# Patient Record
(demographics unavailable — no encounter records)

---

## 2017-11-02 NOTE — CPEKG
Heart Rate: 66

RR Interval: 909

P-R Interval: 152

QRSD Interval: 86

QT Interval: 440

QTC Interval: 461

P Axis: 68

QRS Axis: 69

EKG Severity - NORMAL ECG -

EKG Impression: SINUS RHYTHM

Electronically Signed By: Saeed Luciano 02-Nov-2017 11:00:43

## 2017-11-03 NOTE — POSTANESTH
Post Anesthetic Evaluation


Respiratory Status: Normal, Stable


Level of Consciousness/Mental Status: Can Participate in Eval


Pain Control: Adequate, Prn Tx Ordered


Nausea/Vomiting Control: Adequate, Prn Tx Ordered


Complications Possibly Related to Anesthesia: None Noted

## 2017-11-03 NOTE — POSTOPPROG
Post Op Note


Date of Operation: 11/03/17


Surgeon: Adore Arevalo


Assistant: N/A


Anesthesia: LMA


Pre-op Diagnosis: Bladder tumor


Post-op Diagnosis: same


Indication: Recurrent bladder tumor, hx Ta medium size tumor


Procedure: cystoscopy, bilateral retrograde pyelograms, TURBT tumor small <5mm


Findings: small papillary tumor lateral to left UO, not involving UO.


Inf/Abcess present in the surg proc area at time of surgery?: No


Depth: Organ Space (bladder)


Complications: 





None, patient tolerated procedure well.


Drains: Other (Francis)


Specimen(s): 





Re-TUR near prior resection at anterior left bladder.  Separate bladder 

resection sent for papillary tumor lateral to left UO

## 2017-11-03 NOTE — GOP
[f 
rep st]



                                                                OPERATIVE REPORT





DATE OF OPERATION:  11/03/2017



SURGEON:  Adore Arevalo MD



ANESTHESIA:  LMA



PREOPERATIVE DIAGNOSIS:  Bladder tumor.



POSTOPERATIVE DIAGNOSIS:  Bladder tumor.



PROCEDURE PERFORMED:  cystoscopy, bilateral retrograde pyelograms, TURBT small <
0.5cm, intraoperative fluoroscopy. 



FINDINGS: Upper tracts appeared normal, delicate without hydronephrosis or 
filling defects bilaterally with retrograde pyelograms.  There was small <0.5mm 
papillary tumor, low lying lateral to left ureteral orifice, not involving the 
orifice.  NO tumor seen around prior left anterior resections site.  Bladder 
abnormally shaped due to prior urogyn surgeries, pulled off the to the right 
and high dome.   





INDICATIONS:  The patient underwent a urogynecologic procedure back in November
, 2016 with Dr. Saeed Gaitan at [facility] and, at the time of the cysto for the 
stress urinary incontinence sling, he noted a medium size bladder tumor.  Dr[facility] Vuong at [facility] then did a resection of this tumor in January, 2017 
and pathology was a TA but there was no muscle in the specimen and it was low 
grade. She had not had a surveillance cysto and came to me for that 
surveillance cysto approximately 2 weeks ago.  I looked in her bladder in my 
office and I noted what looked like recurrent tumor around the prior resection 
site and also other abnormal areas. I advised her to go back for a TUR of these 
areas.  The rationale, risks and benefits were discussed in detail with the 
patient and she agree to proceed.  Risks included bleeding, infection, pain, 
perforation of the bladder, injury to the bladder, injury to the urethra.  She 
understood these risks do not outweigh the benefit of resecting her tumor and 
she agreed to proceed.



DESCRIPTION OF PROCEDURE:  She was taken back to the cystoscopy suite, placed 
on the cystoscopy table in a supine position.  General anesthesia with an LMA 
was induced without complication.  A time-out was performed and core measures 
were satisfied including placement of a Bear Hugger, SCDs and administration of 
2 g of Ancef antibiotic. She was brought to the end of the table, placed in a 
dorsal lithotomy position, all pressure points were padded.  Genitalia were 
prepped and draped in a standard surgical fashion with Betadine.  A rigid 
cystoscope easily cannulated her urethral meatus and was advanced 
atraumatically into the bladder.  I did pan cystoscopy with a 30 and a 70 
degree lens.  Her bladder shape was abnormal, with the bladder pulled to the 
right and the dome very high.  Her left and right ureteral orifices were to the 
right, but otherwise in the correct anatomical position, no tumor was noted 
around them but there were  two areas of small, low lying papillary tumors just 
lateral to the left ureteral orifice.  Also, I noted in my clinic, that she was 
abnormal around that prior resection site but it did not look abnormal on my 
cysto in the OR but, given that there was no muscle in the specimen, I elected 
to also want to resect around that prior tumor area as well just to get some 
deeper samples.  She has not had a CT urogram recently and so I elected to do 
bilateral retrograde pyelograms.  I did the left collecting system first.  I 
advanced a 5-Greek open-ended Pollack catheter through the cystoscope and just 
into the left ureteral orifice.  Retrograde was performed and the collecting 
system was smooth and delicate.  There were no filling defects.  The renal 
pelvis was without hydronephrosis and was normal appearing and all the calices 
were normal appearing.  I then did a retrograde on the right.  I ran my own 
fluoroscopy for all of this and did all the interpretation.  I did the 
retrograde on the right in the same fashion and again, the ureter as well as 
the renal pelvis and the right collecting system were normal with no 
abnormalities, no filling defects or concerns for abnormal pathology.  The 
contrast also drained appropriately in both the right and left collecting 
systems.  



Next, I removed the cystoscope and assembled the resectoscope.  I started 
resecting with the prior area of resection around the bladder neck anterior 
left and used electrocautery to gain hemostasis.  There was very little 
bleeding.  Again, I did not see anything grossly abnormal but I did feel I 
wanted to resect this a little deeper to make sure there was no abnormal 
pathology.  These samples were collected and sent separately.  Her bladder was 
very oddly shaped with her recent urogynecologic procedures and, when you go in 
with your scope, the bladder goes off to her right side and then has a dome 
that is more then to the left and very high.  So, it was very difficult getting 
these bladder tumor pieces out given the abnormal shape of the bladder but I 
used an [employer] evacuator and was able to remove the pieces this way.  



Next, I then resected the small papillary low-lying tumor just left of the 
lateral ureteral orifice.  I did send this separately as well.  I used 
electrocautery around the sites of resection to cauterize a nice margin.  The 
left ureteral orifice was not involved at all in the resection nor was the left 
ureter.  The resections were a little deeper and so, at this point, I elected 
not to do Mitomycin just given that they were a little deeper and could have a 
small microperforation although I did not see any gross perforation.



At this point, all the tumor pieces were gathered.  I did also use an [employer] 
evacuator to gather some of those tumor pieces just because the abnormal 
anatomy of the bladder made it difficult for grabbing the pieces with the 
resectoscope loop.  Nonetheless, all the bladder tumor was removed at the end 
of the case.  All the pieces were sent, as I had previously said, and 
hemostasis was excellent.  I removed the scope and advanced the 16-Greek Francis 
catheter to empty her bladder.  I sent her into the PACU with the catheter and 
PACU will remove the catheter depending on how the urine appears in PACU.  She 
did well during the procedure and awoke from the general anesthesia without 
complication.



COMPLICATIONS:  None, patient tolerated procedure well.. 





Job #:  [id number]

MTDD

## 2017-11-03 NOTE — GOP
[f 
rep st]



                                                                OPERATIVE REPORT





DATE OF OPERATION:  11/03/2017



SURGEON:  Adore Arevalo MD



ANESTHESIA:  LMA



PREOPERATIVE DIAGNOSIS:  Bladder tumor.



POSTOPERATIVE DIAGNOSIS:  Bladder tumor.



PROCEDURE PERFORMED:  cystoscopy, bilateral retrograde pyelograms, TURBT small <
0.5cm, intraoperative fluoroscopy. 



FINDINGS: Upper tracts appeared normal, delicate without hydronephrosis or 
filling defects bilaterally with retrograde pyelograms.  There was small <0.5mm 
papillary tumor, low lying lateral to left ureteral orifice, not involving the 
orifice.  NO tumor seen around prior left anterior resections site.  Bladder 
abnormally shaped due to prior urogyn surgeries, pulled off the to the right 
and high dome.   





INDICATIONS:  The patient underwent a urogynecologic procedure back in November
, 2016 with Dr. Saeed Gaitan at [facility] and, at the time of the cysto for the 
stress urinary incontinence sling, he noted a medium size bladder tumor.  Dr. Vuong at [facility] then did a resection of this tumor in January, 2017 
and pathology was a TA but there was no muscle in the specimen and it was low 
grade. She had not had a surveillance cysto and came to me for that 
surveillance cysto approximately 2 weeks ago.  I looked in her bladder in my 
office and I noted what looked like recurrent tumor around the prior resection 
site and also other abnormal areas. I advised her to go back for a TUR of these 
areas.  The rationale, risks and benefits were discussed in detail with the 
patient and she agree to proceed.  Risks included bleeding, infection, pain, 
perforation of the bladder, injury to the bladder, injury to the urethra.  She 
understood these risks do not outweigh the benefit of resecting her tumor and 
she agreed to proceed.



DESCRIPTION OF PROCEDURE:  She was taken back to the cystoscopy suite, placed 
on the cystoscopy table in a supine position.  General anesthesia with an LMA 
was induced without complication.  A time-out was performed and core measures 
were satisfied including placement of a Bear Hugger, SCDs and administration of 
2 g of Ancef antibiotic. She was brought to the end of the table, placed in a 
dorsal lithotomy position, all pressure points were padded.  Genitalia were 
prepped and draped in a standard surgical fashion with Betadine.  A rigid 
cystoscope easily cannulated her urethral meatus and was advanced 
atraumatically into the bladder.  I did pan cystoscopy with a 30 and a 70 
degree lens.  Her bladder shape was abnormal, with the bladder pulled to the 
right and the dome very high.  Her left and right ureteral orifices were to the 
right, but otherwise in the correct anatomical position, no tumor was noted 
around them but there were  two areas of small, low lying papillary tumors just 
lateral to the left ureteral orifice.  Also, I noted in my clinic, that she was 
abnormal around that prior resection site but it did not look abnormal on my 
cysto in the OR but, given that there was no muscle in the specimen, I elected 
to also want to resect around that prior tumor area as well just to get some 
deeper samples.  She has not had a CT urogram recently and so I elected to do 
bilateral retrograde pyelograms.  I did the left collecting system first.  I 
advanced a 5-Romansh open-ended Pollack catheter through the cystoscope and just 
into the left ureteral orifice.  Retrograde was performed and the collecting 
system was smooth and delicate.  There were no filling defects.  The renal 
pelvis was without hydronephrosis and was normal appearing and all the calices 
were normal appearing.  I then did a retrograde on the right.  I ran my own 
fluoroscopy for all of this and did all the interpretation.  I did the 
retrograde on the right in the same fashion and again, the ureter as well as 
the renal pelvis and the right collecting system were normal with no 
abnormalities, no filling defects or concerns for abnormal pathology.  The 
contrast also drained appropriately in both the right and left collecting 
systems.  



Next, I removed the cystoscope and assembled the resectoscope.  I started 
resecting with the prior area of resection around the bladder neck anterior 
left and used electrocautery to gain hemostasis.  There was very little 
bleeding.  Again, I did not see anything grossly abnormal but I did feel I 
wanted to resect this a little deeper to make sure there was no abnormal 
pathology.  These samples were collected and sent separately.  Her bladder was 
very oddly shaped with her recent urogynecologic procedures and, when you go in 
with your scope, the bladder goes off to her right side and then has a dome 
that is more then to the left and very high.  So, it was very difficult getting 
these bladder tumor pieces out given the abnormal shape of the bladder but I 
used an [employer] evacuator and was able to remove the pieces this way.  



Next, I then resected the small papillary low-lying tumor just left of the 
lateral ureteral orifice.  I did send this separately as well.  I used 
electrocautery around the sites of resection to cauterize a nice margin.  The 
left ureteral orifice was not involved at all in the resection nor was the left 
ureter.  The resections were a little deeper and so, at this point, I elected 
not to do Mitomycin just given that they were a little deeper and could have a 
small microperforation although I did not see any gross perforation.



At this point, all the tumor pieces were gathered.  I did also use an [employer] 
evacuator to gather some of those tumor pieces just because the abnormal 
anatomy of the bladder made it difficult for grabbing the pieces with the 
resectoscope loop.  Nonetheless, all the bladder tumor was removed at the end 
of the case.  All the pieces were sent, as I had previously said, and 
hemostasis was excellent.  I removed the scope and advanced the 16-Romansh Francis 
catheter to empty her bladder.  I sent her into the PACU with the catheter and 
PACU will remove the catheter depending on how the urine appears in PACU.  She 
did well during the procedure and awoke from the general anesthesia without 
complication.



COMPLICATIONS:  None, patient tolerated procedure well.. 





Job #:  [id number]

MTDD

## 2017-11-03 NOTE — PDANEPAE
ANE History of Present Illness





57 year old woman for cystoscopy for excision of recurrent bladder tumor.  

History of Asthma, Obesity, Hypertension, and ADHD.





ANE Past Medical History





- Cardiovascular History


Hx Hypertension: Yes


Hx Arrhythmias: No


Hx Chest Pain: No


Hx Coronary Artery / Peripheral Vascular Disease: No


Hx CHF / Valvular Disease: No


Hx Palpitations: No





- Pulmonary History


Hx COPD: Yes


Hx Recent Upper Respiratory Infection: Yes


Hx Oxygen in Use at Home: No


Hx Sleep Apnea: No


Sleep Apnea Screening Result - Last Documented: Positive


Pulmonary History Comment: TROUBLE BREATHING AFTER SURGERY 2/2017 USED OXYGEN 

AT HOME POST OP





- Neurologic History


Hx Cerebrovascular Accident: No


Hx Seizures: No


Hx Dementia: No





- Endocrine History


Hx Diabetes: No


Endocrine History Comment: HYPOTHYROID





- Renal History


Hx Renal Disorders: No


Renal History Comment: HAS HAD UA INCONT SURG NOW DOING OK





- Liver History


Hx Hepatic Disorders: No





- Neurological & Psychiatric Hx


Hx Neurological and Psychiatric Disorders: Yes


Neurological / Psychiatric History Comment: ADHD.  CHRONIC FATIQUE.  DEPRESSION





- Cancer History


Hx Cancer: Yes


Cancer History Comment: BLADDER





- Congenital Disorder History


Hx Congenital Disorders: No





- GI History


Hx Gastrointestinal Disorders: Yes


Gastrointestinal History Comment: WITH HUNGER FELT LIKE SHE WOULD THROW UP 

PLACED ON RX WITH RESOLUTION





- Other Health History


Other Health History: WEAK BACK.  INTERMITTENT SCIATICA





- Chronic Pain History


Chronic Pain: No





- Surgical History


Prior Surgeries: 11/2016 AT [facility] AND BLADDER SLING(NOTICED BLADDER 

TUMOR) WITH REMVL OF  TUMOR 2/2017.  DX LAP.  REML CERVICAL POLYP/DYSPLASIA





ANE Review of Systems


Review of Systems: 








- Exercise capacity


METS (RN): 3 METS





ANE Patient History





- Allergies


Allergies/Adverse Reactions: 








hydrocodone [From Vicodin] Allergy (Verified 10/31/17 14:45)


 ITCH


metronidazole [From Flagyl] Allergy (Verified 10/31/17 14:45)


 SWELLING


oxycodone [From Percocet] Allergy (Verified 10/31/17 14:46)


 GI








- Home Medications


Home Medications: 








Celexa DAILY06 10/31/17 [Last Taken Unknown]


Dulera 100 Mcg/5 Mcg Inhaler BID 10/31/17 [Last Taken Unknown]


Flonase Nasal [place] BID 10/31/17 [Last Taken Unknown]


Herbal Drugs DAILY 10/31/17 [Last Taken 11/02/17]


Hydrochlorothiazide DAILY06 10/31/17 [Last Taken 11/02/17]


Liothyronine Sodium DAILY06 10/31/17 [Last Taken 11/02/17]


Montelukast Sodium HS 10/31/17 [Last Taken 11/02/17]


Proair Hfa PRN 10/31/17 [Last Taken 11/02/17]


Ranitidine HCl BID 10/31/17 [Last Taken 11/03/17 06:00]


Synthroid DAILY06 10/31/17 [Last Taken 11/03/17 06:00]


VYVANSE DAILY06 10/31/17 [Last Taken 11/02/17]








- Smoking Hx


Smoking Status: Former smoker





ANE Labs/Vital Signs





- Labs


Result Diagrams: 


 11/01/17 14:38





- Vital Signs


Height: 160.02 cm


Weight: 93.894 kg





ANE Physical Exam





- Airway


Mallampati Score: Class 2


Mouth exam: dentures





- Pulmonary


Pulmonary: no respiratory distress





- Cardiovascular


Cardiovascular: regular rate and rhythym





- ASA Status


ASA Status: III

## 2017-11-03 NOTE — PDANEPAE
ANE History of Present Illness





57 year old woman for cystoscopy for excision of recurrent bladder tumor.  

History of Asthma, Obesity, Hypertension, and ADHD.





ANE Past Medical History





- Cardiovascular History


Hx Hypertension: Yes


Hx Arrhythmias: No


Hx Chest Pain: No


Hx Coronary Artery / Peripheral Vascular Disease: No


Hx CHF / Valvular Disease: No


Hx Palpitations: No





- Pulmonary History


Hx COPD: Yes


Hx Recent Upper Respiratory Infection: Yes


Hx Oxygen in Use at Home: No


Hx Sleep Apnea: No


Sleep Apnea Screening Result - Last Documented: Positive


Pulmonary History Comment: TROUBLE BREATHING AFTER SURGERY 2/2017 USED OXYGEN 

AT HOME POST OP





- Neurologic History


Hx Cerebrovascular Accident: No


Hx Seizures: No


Hx Dementia: No





- Endocrine History


Hx Diabetes: No


Endocrine History Comment: HYPOTHYROID





- Renal History


Hx Renal Disorders: No


Renal History Comment: HAS HAD UA INCONT SURG NOW DOING OK





- Liver History


Hx Hepatic Disorders: No





- Neurological & Psychiatric Hx


Hx Neurological and Psychiatric Disorders: Yes


Neurological / Psychiatric History Comment: ADHD.  CHRONIC FATIQUE.  DEPRESSION





- Cancer History


Hx Cancer: Yes


Cancer History Comment: BLADDER





- Congenital Disorder History


Hx Congenital Disorders: No





- GI History


Hx Gastrointestinal Disorders: Yes


Gastrointestinal History Comment: WITH HUNGER FELT LIKE SHE WOULD THROW UP 

PLACED ON RX WITH RESOLUTION





- Other Health History


Other Health History: WEAK BACK.  INTERMITTENT SCIATICA





- Chronic Pain History


Chronic Pain: No





- Surgical History


Prior Surgeries: 11/2016 AT [facility] AND BLADDER SLING(NOTICED BLADDER 

TUMOR) WITH REMVL OF  TUMOR 2/2017.  DX LAP.  REML CERVICAL POLYP/DYSPLASIA





ANE Review of Systems


Review of Systems: 








- Exercise capacity


METS (RN): 3 METS





ANE Patient History





- Allergies


Allergies/Adverse Reactions: 








hydrocodone [From Vicodin] Allergy (Verified 10/31/17 14:45)


 ITCH


metronidazole [From Flagyl] Allergy (Verified 10/31/17 14:45)


 SWELLING


oxycodone [From Percocet] Allergy (Verified 10/31/17 14:46)


 GI








- Home Medications


Home Medications: 








Celexa DAILY06 10/31/17 [Last Taken Unknown]


Dulera 100 Mcg/5 Mcg Inhaler BID 10/31/17 [Last Taken Unknown]


Flonase Nasal [place] BID 10/31/17 [Last Taken Unknown]


Herbal Drugs DAILY 10/31/17 [Last Taken 11/02/17]


Hydrochlorothiazide DAILY06 10/31/17 [Last Taken 11/02/17]


Liothyronine Sodium DAILY06 10/31/17 [Last Taken 11/02/17]


Montelukast Sodium HS 10/31/17 [Last Taken 11/02/17]


Proair Hfa PRN 10/31/17 [Last Taken 11/02/17]


Ranitidine HCl BID 10/31/17 [Last Taken 11/03/17 06:00]


Synthroid DAILY06 10/31/17 [Last Taken 11/03/17 06:00]


VYVANSE DAILY06 10/31/17 [Last Taken 11/02/17]








- Smoking Hx


Smoking Status: Former smoker





ANE Labs/Vital Signs





- Labs


Result Diagrams: 


 11/01/17 14:38





- Vital Signs


Height: 160.02 cm


Weight: 93.894 kg





ANE Physical Exam





- Airway


Mallampati Score: Class 2


Mouth exam: dentures





- Pulmonary


Pulmonary: no respiratory distress





- Cardiovascular


Cardiovascular: regular rate and rhythym





- ASA Status


ASA Status: III

## 2017-11-03 NOTE — PDANEPAE
ANE History of Present Illness





57 year old woman for cystoscopy for excision of recurrent bladder tumor.  

History of Asthma, Obesity, Hypertension, and ADHD.





ANE Past Medical History





- Cardiovascular History


Hx Hypertension: Yes


Hx Arrhythmias: No


Hx Chest Pain: No


Hx Coronary Artery / Peripheral Vascular Disease: No


Hx CHF / Valvular Disease: No


Hx Palpitations: No





- Pulmonary History


Hx COPD: Yes


Hx Recent Upper Respiratory Infection: Yes


Hx Oxygen in Use at Home: No


Hx Sleep Apnea: No


Sleep Apnea Screening Result - Last Documented: Positive


Pulmonary History Comment: TROUBLE BREATHING AFTER SURGERY 2/2017 USED OXYGEN 

AT HOME POST OP





- Neurologic History


Hx Cerebrovascular Accident: No


Hx Seizures: No


Hx Dementia: No





- Endocrine History


Hx Diabetes: No


Endocrine History Comment: HYPOTHYROID





- Renal History


Hx Renal Disorders: No


Renal History Comment: HAS HAD UA INCONT SURG NOW DOING OK





- Liver History


Hx Hepatic Disorders: No





- Neurological & Psychiatric Hx


Hx Neurological and Psychiatric Disorders: Yes


Neurological / Psychiatric History Comment: ADHD.  CHRONIC FATIQUE.  DEPRESSION





- Cancer History


Hx Cancer: Yes


Cancer History Comment: BLADDER





- Congenital Disorder History


Hx Congenital Disorders: No





- GI History


Hx Gastrointestinal Disorders: Yes


Gastrointestinal History Comment: WITH HUNGER FELT LIKE SHE WOULD THROW UP 

PLACED ON RX WITH RESOLUTION





- Other Health History


Other Health History: WEAK BACK.  INTERMITTENT SCIATICA





- Chronic Pain History


Chronic Pain: No





- Surgical History


Prior Surgeries: 11/2016 AT [facility] AND BLADDER SLING(NOTICED BLADDER 

TUMOR) WITH REMVL OF  TUMOR 2/2017.  DX LAP.  REML CERVICAL POLYP/DYSPLASIA





ANE Review of Systems


Review of Systems: 








- Exercise capacity


METS (RN): 3 METS





ANE Patient History





- Allergies


Allergies/Adverse Reactions: 








hydrocodone [From Vicodin] Allergy (Verified 10/31/17 14:45)


 ITCH


metronidazole [From Flagyl] Allergy (Verified 10/31/17 14:45)


 SWELLING


oxycodone [From Percocet] Allergy (Verified 10/31/17 14:46)


 GI








- Home Medications


Home Medications: 








Celexa DAILY06 10/31/17 [Last Taken Unknown]


Dulera 100 Mcg/5 Mcg Inhaler BID 10/31/17 [Last Taken Unknown]


Flonase Nasal [place] BID 10/31/17 [Last Taken Unknown]


Herbal Drugs DAILY 10/31/17 [Last Taken 11/02/17]


Hydrochlorothiazide DAILY06 10/31/17 [Last Taken 11/02/17]


Liothyronine Sodium DAILY06 10/31/17 [Last Taken 11/02/17]


Montelukast Sodium HS 10/31/17 [Last Taken 11/02/17]


Proair Hfa PRN 10/31/17 [Last Taken 11/02/17]


Ranitidine HCl BID 10/31/17 [Last Taken 11/03/17 06:00]


Synthroid DAILY06 10/31/17 [Last Taken 11/03/17 06:00]


VYVANSE DAILY06 10/31/17 [Last Taken 11/02/17]








- Smoking Hx


Smoking Status: Former smoker





ANE Labs/Vital Signs





- Labs


Result Diagrams: 


 11/01/17 14:38





- Vital Signs


Height: 160.02 cm


Weight: 93.894 kg





ANE Physical Exam





- Airway


Mallampati Score: Class 2


Mouth exam: dentures





- Pulmonary


Pulmonary: no respiratory distress





- Cardiovascular


Cardiovascular: regular rate and rhythym





- ASA Status


ASA Status: III

## 2017-11-03 NOTE — GOP
[f 
rep st]



                                                                OPERATIVE REPORT





DATE OF OPERATION:  11/03/2017



SURGEON:  Adore Arevalo MD



ANESTHESIA:  LMA



PREOPERATIVE DIAGNOSIS:  Bladder tumor.



POSTOPERATIVE DIAGNOSIS:  Bladder tumor.



PROCEDURE PERFORMED:  cystoscopy, bilateral retrograde pyelograms, TURBT small <
0.5cm, intraoperative fluoroscopy. 



FINDINGS: Upper tracts appeared normal, delicate without hydronephrosis or 
filling defects bilaterally with retrograde pyelograms.  There was small <0.5mm 
papillary tumor, low lying lateral to left ureteral orifice, not involving the 
orifice.  NO tumor seen around prior left anterior resections site.  Bladder 
abnormally shaped due to prior urogyn surgeries, pulled off the to the right 
and high dome.   





INDICATIONS:  The patient underwent a urogynecologic procedure back in November
, 2016 with Dr. Saeed Gaitan at [facility] and, at the time of the cysto for the 
stress urinary incontinence sling, he noted a medium size bladder tumor.  Dr[facility] Vu[facility] at [facility] did a resection of this tumor in January, 2017 
and pathology was a TA but there was no muscle in the specimen and it was low 
grade. She had not had a surveillance cysto and came to me for that 
surveillance cysto approximately 2 weeks ago.  I looked in her bladder in my 
office and I noted what looked like recurrent tumor around the prior resection 
site and also other abnormal areas. I advised her to go back for a TUR of these 
areas.  The rationale, risks and benefits were discussed in detail with the 
patient and she agree to proceed.  Risks included bleeding, infection, pain, 
perforation of the bladder, injury to the bladder, injury to the urethra.  She 
understood these risks do not outweigh the benefit of resecting her tumor and 
she agreed to proceed.



DESCRIPTION OF PROCEDURE:  She was taken back to the cystoscopy suite, placed 
on the cystoscopy table in a supine position.  General anesthesia with an LMA 
was induced without complication.  A time-out was performed and core measures 
were satisfied including placement of a Bear Hugger, SCDs and administration of 
2 g of Ancef antibiotic. She was brought to the end of the table, placed in a 
dorsal lithotomy position, all pressure points were padded.  Genitalia were 
prepped and draped in a standard surgical fashion with Betadine.  A rigid 
cystoscope easily cannulated her urethral meatus and was advanced 
atraumatically into the bladder.  I did pan cystoscopy with a 30 and a 70 
degree lens.  Her bladder shape was abnormal, with the bladder pulled to the 
right and the dome very high.  Her left and right ureteral orifices were to the 
right, but otherwise in the correct anatomical position, no tumor was noted 
around them but there were  two areas of small, low lying papillary tumors just 
lateral to the left ureteral orifice.  Also, I noted in my clinic, that she was 
abnormal around that prior resection site but it did not look abnormal on my 
cysto in the OR but, given that there was no muscle in the specimen, I elected 
to also want to resect around that prior tumor area as well just to get some 
deeper samples.  She has not had a CT urogram recently and so I elected to do 
bilateral retrograde pyelograms.  I did the left collecting system first.  I 
advanced a 5-Georgian open-ended Pollack catheter through the cystoscope and just 
into the left ureteral orifice.  Retrograde was performed and the collecting 
system was smooth and delicate.  There were no filling defects.  The renal 
pelvis was without hydronephrosis and was normal appearing and all the calices 
were normal appearing.  I then did a retrograde on the right.  I ran my own 
fluoroscopy for all of this and did all the interpretation.  I did the 
retrograde on the right in the same fashion and again, the ureter as well as 
the renal pelvis and the right collecting system were normal with no 
abnormalities, no filling defects or concerns for abnormal pathology.  The 
contrast also drained appropriately in both the right and left collecting 
systems.  



Next, I removed the cystoscope and assembled the resectoscope.  I started 
resecting with the prior area of resection around the bladder neck anterior 
left and used electrocautery to gain hemostasis.  There was very little 
bleeding.  Again, I did not see anything grossly abnormal but I did feel I 
wanted to resect this a little deeper to make sure there was no abnormal 
pathology.  These samples were collected and sent separately.  Her bladder was 
very oddly shaped with her recent urogynecologic procedures and, when you go in 
with your scope, the bladder goes off to her right side and then has a dome 
that is more then to the left and very high.  So, it was very difficult getting 
these bladder tumor pieces out given the abnormal shape of the bladder but I 
used an [employer] evacuator and was able to remove the pieces this way.  



Next, I then resected the small papillary low-lying tumor just left of the 
lateral ureteral orifice.  I did send this separately as well.  I used 
electrocautery around the sites of resection to cauterize a nice margin.  The 
left ureteral orifice was not involved at all in the resection nor was the left 
ureter.  The resections were a little deeper and so, at this point, I elected 
not to do Mitomycin just given that they were a little deeper and could have a 
small microperforation although I did not see any gross perforation.



At this point, all the tumor pieces were gathered.  I did also use an [employer] 
evacuator to gather some of those tumor pieces just because the abnormal 
anatomy of the bladder made it difficult for grabbing the pieces with the 
resectoscope loop.  Nonetheless, all the bladder tumor was removed at the end 
of the case.  All the pieces were sent, as I had previously said, and 
hemostasis was excellent.  I removed the scope and advanced the 16-Georgian Francis 
catheter to empty her bladder.  I sent her into the PACU with the catheter and 
PACU will remove the catheter depending on how the urine appears in PACU.  She 
did well during the procedure and awoke from the general anesthesia without 
complication.



COMPLICATIONS:  None, patient tolerated procedure well.. 





Job #:  [id number]

MTDD